# Patient Record
Sex: FEMALE | Race: WHITE
[De-identification: names, ages, dates, MRNs, and addresses within clinical notes are randomized per-mention and may not be internally consistent; named-entity substitution may affect disease eponyms.]

---

## 2019-02-05 ENCOUNTER — HOSPITAL ENCOUNTER (INPATIENT)
Dept: HOSPITAL 54 - PART | Age: 74
LOS: 4 days | Discharge: HOME HEALTH | DRG: 253 | End: 2019-02-09
Attending: FAMILY MEDICINE | Admitting: FAMILY MEDICINE
Payer: COMMERCIAL

## 2019-02-05 VITALS — BODY MASS INDEX: 43.97 KG/M2 | HEIGHT: 55 IN | WEIGHT: 190 LBS

## 2019-02-05 VITALS — DIASTOLIC BLOOD PRESSURE: 69 MMHG | SYSTOLIC BLOOD PRESSURE: 144 MMHG

## 2019-02-05 VITALS — DIASTOLIC BLOOD PRESSURE: 73 MMHG | SYSTOLIC BLOOD PRESSURE: 156 MMHG

## 2019-02-05 VITALS — SYSTOLIC BLOOD PRESSURE: 144 MMHG | DIASTOLIC BLOOD PRESSURE: 69 MMHG

## 2019-02-05 DIAGNOSIS — E11.65: ICD-10-CM

## 2019-02-05 DIAGNOSIS — Z87.11: ICD-10-CM

## 2019-02-05 DIAGNOSIS — M19.90: ICD-10-CM

## 2019-02-05 DIAGNOSIS — Z79.4: ICD-10-CM

## 2019-02-05 DIAGNOSIS — Z91.14: ICD-10-CM

## 2019-02-05 DIAGNOSIS — F43.10: ICD-10-CM

## 2019-02-05 DIAGNOSIS — E11.40: ICD-10-CM

## 2019-02-05 DIAGNOSIS — Z91.81: ICD-10-CM

## 2019-02-05 DIAGNOSIS — F43.20: ICD-10-CM

## 2019-02-05 DIAGNOSIS — E11.22: ICD-10-CM

## 2019-02-05 DIAGNOSIS — K92.2: Primary | ICD-10-CM

## 2019-02-05 DIAGNOSIS — Z86.73: ICD-10-CM

## 2019-02-05 DIAGNOSIS — K21.0: ICD-10-CM

## 2019-02-05 DIAGNOSIS — E66.01: ICD-10-CM

## 2019-02-05 DIAGNOSIS — I11.0: ICD-10-CM

## 2019-02-05 DIAGNOSIS — R00.1: ICD-10-CM

## 2019-02-05 DIAGNOSIS — H35.30: ICD-10-CM

## 2019-02-05 DIAGNOSIS — F32.9: ICD-10-CM

## 2019-02-05 DIAGNOSIS — G47.33: ICD-10-CM

## 2019-02-05 DIAGNOSIS — I31.3: ICD-10-CM

## 2019-02-05 DIAGNOSIS — Z79.02: ICD-10-CM

## 2019-02-05 DIAGNOSIS — I50.32: ICD-10-CM

## 2019-02-05 DIAGNOSIS — I10: ICD-10-CM

## 2019-02-05 DIAGNOSIS — K44.9: ICD-10-CM

## 2019-02-05 DIAGNOSIS — E78.5: ICD-10-CM

## 2019-02-05 DIAGNOSIS — Z87.440: ICD-10-CM

## 2019-02-05 DIAGNOSIS — K76.0: ICD-10-CM

## 2019-02-05 DIAGNOSIS — E11.21: ICD-10-CM

## 2019-02-05 DIAGNOSIS — I25.10: ICD-10-CM

## 2019-02-05 DIAGNOSIS — E11.9: ICD-10-CM

## 2019-02-05 DIAGNOSIS — Z95.5: ICD-10-CM

## 2019-02-05 DIAGNOSIS — E11.43: ICD-10-CM

## 2019-02-05 DIAGNOSIS — M81.0: ICD-10-CM

## 2019-02-05 DIAGNOSIS — E11.51: ICD-10-CM

## 2019-02-05 DIAGNOSIS — R06.00: ICD-10-CM

## 2019-02-05 DIAGNOSIS — Z79.82: ICD-10-CM

## 2019-02-05 DIAGNOSIS — F03.90: ICD-10-CM

## 2019-02-05 DIAGNOSIS — D63.8: ICD-10-CM

## 2019-02-05 DIAGNOSIS — K46.9: ICD-10-CM

## 2019-02-05 DIAGNOSIS — N28.1: ICD-10-CM

## 2019-02-05 LAB
ALBUMIN SERPL BCP-MCNC: 3.1 G/DL (ref 3.4–5)
ALP SERPL-CCNC: 93 U/L (ref 46–116)
ALT SERPL W P-5'-P-CCNC: 18 U/L (ref 12–78)
AMYLASE SERPL-CCNC: 30 U/L (ref 25–115)
AST SERPL W P-5'-P-CCNC: 10 U/L (ref 15–37)
BASOPHILS # BLD AUTO: 0.1 /CMM (ref 0–0.2)
BASOPHILS NFR BLD AUTO: 1.2 % (ref 0–2)
BILIRUB DIRECT SERPL-MCNC: 0.1 MG/DL (ref 0–0.2)
BILIRUB SERPL-MCNC: 0.5 MG/DL (ref 0.2–1)
BUN SERPL-MCNC: 22 MG/DL (ref 7–18)
CALCIUM SERPL-MCNC: 8.8 MG/DL (ref 8.5–10.1)
CHLORIDE SERPL-SCNC: 100 MMOL/L (ref 98–107)
CHOLEST SERPL-MCNC: 264 MG/DL (ref ?–200)
CO2 SERPL-SCNC: 31 MMOL/L (ref 21–32)
CREAT SERPL-MCNC: 1 MG/DL (ref 0.6–1.3)
EOSINOPHIL NFR BLD AUTO: 2.2 % (ref 0–6)
GLUCOSE SERPL-MCNC: 414 MG/DL (ref 74–106)
HCT VFR BLD AUTO: 32 % (ref 33–45)
HDLC SERPL-MCNC: 35 MG/DL (ref 40–60)
HGB BLD-MCNC: 10.7 G/DL (ref 11.5–14.8)
IRON SERPL-MCNC: 51 UG/DL (ref 50–175)
LDLC SERPL DIRECT ASSAY-MCNC: 166 MG/DL (ref 0–99)
LIPASE SERPL-CCNC: 133 U/L (ref 73–393)
LYMPHOCYTES NFR BLD AUTO: 1.1 /CMM (ref 0.8–4.8)
LYMPHOCYTES NFR BLD AUTO: 17.2 % (ref 20–44)
MAGNESIUM SERPL-MCNC: 1.6 MG/DL (ref 1.8–2.4)
MCHC RBC AUTO-ENTMCNC: 34 G/DL (ref 31–36)
MCV RBC AUTO: 86 FL (ref 82–100)
MONOCYTES NFR BLD AUTO: 0.4 /CMM (ref 0.1–1.3)
MONOCYTES NFR BLD AUTO: 5.9 % (ref 2–12)
NEUTROPHILS # BLD AUTO: 4.8 /CMM (ref 1.8–8.9)
NEUTROPHILS NFR BLD AUTO: 73.5 % (ref 43–81)
NT-PROBNP SERPL-MCNC: 1279 PG/ML (ref 0–125)
PLATELET # BLD AUTO: 219 /CMM (ref 150–450)
POTASSIUM SERPL-SCNC: 3.9 MMOL/L (ref 3.5–5.1)
PROT SERPL-MCNC: 6.8 G/DL (ref 6.4–8.2)
RBC # BLD AUTO: 3.67 MIL/UL (ref 4–5.2)
SODIUM SERPL-SCNC: 135 MMOL/L (ref 136–145)
TRIGL SERPL-MCNC: 356 MG/DL (ref 30–150)
WBC NRBC COR # BLD AUTO: 6.5 K/UL (ref 4.3–11)

## 2019-02-05 PROCEDURE — G0378 HOSPITAL OBSERVATION PER HR: HCPCS

## 2019-02-05 PROCEDURE — C9113 INJ PANTOPRAZOLE SODIUM, VIA: HCPCS

## 2019-02-05 PROCEDURE — A9541 TC99M SULFUR COLLOID: HCPCS

## 2019-02-05 RX ADMIN — MAGNESIUM SULFATE IN DEXTROSE SCH MLS/HR: 10 INJECTION, SOLUTION INTRAVENOUS at 23:29

## 2019-02-05 RX ADMIN — DEXTROSE MONOHYDRATE PRN MG: 50 INJECTION, SOLUTION INTRAVENOUS at 23:56

## 2019-02-05 RX ADMIN — INSULIN GLARGINE SCH UNIT: 100 INJECTION, SOLUTION SUBCUTANEOUS at 22:41

## 2019-02-05 RX ADMIN — Medication SCH EACH: at 21:41

## 2019-02-05 NOTE — NUR
RN NOTES



DISCUSSED WITH DR. ROBLEDO PT. CONDITION, NEW ORDERS WERE GIVEN. MD WILL BE IN TO SEE PT. 
IN 1-2 HOURS.

## 2019-02-05 NOTE — NUR
Low magnesium level 1.[address] Chavez, ordered Magnesium 2gm IV daily x3 doses 
total of 6 grams IV. Repeat blood sugar 367mg/dl , MD is aware. Will cont to monitor.

## 2019-02-05 NOTE — NUR
RN ADMISSION NOTE



RECEIVED PT. IN [facility] A&OX4. BREATHING UNLABORED ON ROOM AIR. PT. DENIES CHEST PAIN, BUT 
HAS NEW ONSET OF ABDOMINAL PAIN. VITALS WILL BE MEASURED. PT. WAS TRANSFERRED INTO BED 
SAFELY. NO NEW MD ORDERS GIVEN AT THIS TIME. INSTRUCTED PT. TO USE CALL LIGHT FOR 
ASSISTANCE. ALL NEEDS MET. WILL CONTINUE TO ASSESS AND MONITOR.

## 2019-02-05 NOTE — NUR
TELE RN INITIAL NOTES



Patient is awake, A/O x3, [place] citizen speaking, understand and speak English some. Stable 
oxygen saturation on RA. Sinus rhythm on the tele monitor HR 70. Abdomen rounded and firm, 
reports some discomfort, denies nausea, no vomiting. Home medication for review, will follow 
up with Dr. Chavez. Maintained safety, will cont to monitor.

## 2019-02-05 NOTE — NUR
RN CLOSING NOTES



PT. IS IN BED SITTING UP A&OX4. BREATHING UNLABORED ON ROOM AIR. PT. REPORTED ABDOMINAL PAIN 
HAS IMPROVED SINCE ARRIVING TO UNIT. PT. TOLERATED JELLO AND CRACKERS WELL. IV IS INTACT AND 
PATENT. NO S/S OF ACUTE DISTRESS. BED IS IN LOWEST, AND LOCKED POSITION. 2 SIDE RAILS UP, 
AND INSTRUCTED PT. TO USE CALL LIGHT FOR ASSISTANCE. NEW MD ORDERS WERE ADDED TO [employer]. 
ENDORSED REPORT TO NURSE.

## 2019-02-05 NOTE — NUR
PT MARC FROM , C/O DIZZINESS AND WEAK, -KO, -CP, -SOB, PT IS AAOX3, 
NOT IN RESPIRATORY DISTRESS, KEPT RESTED AND COMFORTABLE, HOOKED TO MONITOR.

## 2019-02-06 VITALS — SYSTOLIC BLOOD PRESSURE: 189 MMHG | DIASTOLIC BLOOD PRESSURE: 69 MMHG

## 2019-02-06 VITALS — DIASTOLIC BLOOD PRESSURE: 82 MMHG | SYSTOLIC BLOOD PRESSURE: 185 MMHG

## 2019-02-06 VITALS — SYSTOLIC BLOOD PRESSURE: 177 MMHG | DIASTOLIC BLOOD PRESSURE: 70 MMHG

## 2019-02-06 VITALS — DIASTOLIC BLOOD PRESSURE: 63 MMHG | SYSTOLIC BLOOD PRESSURE: 160 MMHG

## 2019-02-06 VITALS — DIASTOLIC BLOOD PRESSURE: 62 MMHG | SYSTOLIC BLOOD PRESSURE: 146 MMHG

## 2019-02-06 VITALS — SYSTOLIC BLOOD PRESSURE: 185 MMHG | DIASTOLIC BLOOD PRESSURE: 82 MMHG

## 2019-02-06 LAB
AMYLASE SERPL-CCNC: 27 U/L (ref 25–115)
BASOPHILS # BLD AUTO: 0.1 /CMM (ref 0–0.2)
BASOPHILS NFR BLD AUTO: 1 % (ref 0–2)
BUN SERPL-MCNC: 23 MG/DL (ref 7–18)
CALCIUM SERPL-MCNC: 8.9 MG/DL (ref 8.5–10.1)
CHLORIDE SERPL-SCNC: 98 MMOL/L (ref 98–107)
CO2 SERPL-SCNC: 30 MMOL/L (ref 21–32)
CREAT SERPL-MCNC: 0.9 MG/DL (ref 0.6–1.3)
EOSINOPHIL NFR BLD AUTO: 1.7 % (ref 0–6)
FERRITIN SERPL-MCNC: 83 NG/ML (ref 8–388)
GLUCOSE SERPL-MCNC: 329 MG/DL (ref 74–106)
HCT VFR BLD AUTO: 32 % (ref 33–45)
HGB BLD-MCNC: 11 G/DL (ref 11.5–14.8)
IRON SERPL-MCNC: 49 UG/DL (ref 50–175)
LIPASE SERPL-CCNC: 91 U/L (ref 73–393)
LYMPHOCYTES NFR BLD AUTO: 1 /CMM (ref 0.8–4.8)
LYMPHOCYTES NFR BLD AUTO: 14.8 % (ref 20–44)
MAGNESIUM SERPL-MCNC: 2.4 MG/DL (ref 1.8–2.4)
MCHC RBC AUTO-ENTMCNC: 35 G/DL (ref 31–36)
MCV RBC AUTO: 83 FL (ref 82–100)
MONOCYTES NFR BLD AUTO: 0.3 /CMM (ref 0.1–1.3)
MONOCYTES NFR BLD AUTO: 4.8 % (ref 2–12)
NEUTROPHILS # BLD AUTO: 5.2 /CMM (ref 1.8–8.9)
NEUTROPHILS NFR BLD AUTO: 77.7 % (ref 43–81)
PLATELET # BLD AUTO: 254 /CMM (ref 150–450)
POTASSIUM SERPL-SCNC: 3.9 MMOL/L (ref 3.5–5.1)
RBC # BLD AUTO: 3.84 MIL/UL (ref 4–5.2)
SODIUM SERPL-SCNC: 133 MMOL/L (ref 136–145)
TIBC SERPL-MCNC: 277 UG/DL (ref 250–450)
WBC NRBC COR # BLD AUTO: 6.7 K/UL (ref 4.3–11)

## 2019-02-06 RX ADMIN — Medication SCH MCG: at 09:00

## 2019-02-06 RX ADMIN — Medication SCH EACH: at 17:20

## 2019-02-06 RX ADMIN — CARVEDILOL SCH MG: 12.5 TABLET, FILM COATED ORAL at 17:00

## 2019-02-06 RX ADMIN — INSULIN ASPART SCH UNIT: 100 INJECTION, SOLUTION INTRAVENOUS; SUBCUTANEOUS at 12:28

## 2019-02-06 RX ADMIN — AMLODIPINE BESYLATE SCH MG: 5 TABLET ORAL at 09:00

## 2019-02-06 RX ADMIN — DEXTROSE MONOHYDRATE PRN MG: 50 INJECTION, SOLUTION INTRAVENOUS at 17:13

## 2019-02-06 RX ADMIN — DEXTROSE MONOHYDRATE PRN MG: 50 INJECTION, SOLUTION INTRAVENOUS at 05:55

## 2019-02-06 RX ADMIN — POTASSIUM CHLORIDE SCH MEQ: 750 TABLET, FILM COATED, EXTENDED RELEASE ORAL at 09:00

## 2019-02-06 RX ADMIN — SODIUM CHLORIDE PRN MG: 9 INJECTION, SOLUTION INTRAVENOUS at 19:50

## 2019-02-06 RX ADMIN — Medication PRN MG: at 03:51

## 2019-02-06 RX ADMIN — ARIPIPRAZOLE SCH MG: 5 TABLET ORAL at 09:00

## 2019-02-06 RX ADMIN — INSULIN ASPART SCH UNIT: 100 INJECTION, SOLUTION INTRAVENOUS; SUBCUTANEOUS at 08:19

## 2019-02-06 RX ADMIN — INSULIN ASPART SCH UNIT: 100 INJECTION, SOLUTION INTRAVENOUS; SUBCUTANEOUS at 17:00

## 2019-02-06 RX ADMIN — MAGNESIUM SULFATE IN DEXTROSE SCH MLS/HR: 10 INJECTION, SOLUTION INTRAVENOUS at 00:24

## 2019-02-06 RX ADMIN — VALSARTAN SCH MG: 80 TABLET ORAL at 09:00

## 2019-02-06 RX ADMIN — Medication SCH EACH: at 22:21

## 2019-02-06 RX ADMIN — INSULIN DETEMIR SCH UNIT: 100 INJECTION, SOLUTION SUBCUTANEOUS at 22:00

## 2019-02-06 RX ADMIN — Medication SCH EACH: at 07:54

## 2019-02-06 RX ADMIN — CARVEDILOL SCH MG: 12.5 TABLET, FILM COATED ORAL at 09:00

## 2019-02-06 RX ADMIN — FUROSEMIDE SCH MG: 20 TABLET ORAL at 09:00

## 2019-02-06 RX ADMIN — Medication PRN MG: at 00:14

## 2019-02-06 RX ADMIN — Medication PRN MG: at 17:12

## 2019-02-06 RX ADMIN — MAGNESIUM SULFATE IN DEXTROSE SCH MLS/HR: 10 INJECTION, SOLUTION INTRAVENOUS at 17:02

## 2019-02-06 RX ADMIN — INSULIN GLARGINE SCH UNIT: 100 INJECTION, SOLUTION SUBCUTANEOUS at 22:00

## 2019-02-06 RX ADMIN — FENOFIBRATE SCH MG: 145 TABLET ORAL at 09:00

## 2019-02-06 RX ADMIN — INSULIN ASPART SCH UNIT: 100 INJECTION, SOLUTION INTRAVENOUS; SUBCUTANEOUS at 09:00

## 2019-02-06 RX ADMIN — ATORVASTATIN CALCIUM SCH MG: 40 TABLET, FILM COATED ORAL at 22:00

## 2019-02-06 RX ADMIN — AMLODIPINE BESYLATE SCH MG: 5 TABLET ORAL at 21:00

## 2019-02-06 RX ADMIN — MAGNESIUM SULFATE IN DEXTROSE SCH MLS/HR: 10 INJECTION, SOLUTION INTRAVENOUS at 19:44

## 2019-02-06 NOTE — NUR
Patient is seen by Dr. Chavez. Home medications reviewed by MD and faxed to pharmacy. 
Order place for Magnesium level, Iron level and UA.

## 2019-02-06 NOTE — NUR
Received phone call from Dr. Chavez, regarding elevated BP, orders place. MD is aware of 
elevated blood sugar, per MD no sliding scale, cont current orders. Endorsed to oncoming RN.

## 2019-02-06 NOTE — NUR
TELE RN CLOSING NOTES



Patient in bed, Sinus rajesh on the tele monitor lows 35 to 64,Denies dizziness, no episode 
of syncope, no c/o chest pain. For consult today with Dr. Richter/Cardio. Patient is 
currently NPO, for CT abdomen/pelvis with contrast today. For consult Dr. Carey/GI. 
Magnesium supplemented last night. Abdominal pain managed by Morphine IV with intermittent 
relief. Blood glucose slowly trending down, MD is aware. BP still elevated 177/70, denies 
headache, no vomiting, awaiting Dr. Chavez call back. Maintained safety, will endorse to 
oncoming RN.

## 2019-02-06 NOTE — NUR
Elevated /[address] Chavez, awaiting call back. Patient in bed, appears 
comfortable, sleeping. Will cont to monitor.

## 2019-02-06 NOTE — NUR
TELE RN OPENING NOTES



PATIENT IS NAUSEOUS AND VOMITING. OFFERED ZOFRAN BUT PATIENT REFUSED. BED RAILS UPX2, LOCKED 
AND AT LOWEST POSITION. IV LINE IS INTACT AND PATENT. CALL LIGHT IS WITHIN REACH. WILL 
CONTINUE TO MONITOR.

## 2019-02-06 NOTE — NUR
TELE RN NOTES

RECEIVED ON BED A/O X3,BREATHING REGULAR,NOT IN ANY FORM DISTRESS.FAMILY MEMBERS AT 
BEDSIDE.SALINE LOCK RIGHT HAND INTACT AND PATENT.NPO STATUS AS ORDERED.PER REPORT BY DAY 
NURSE MARCI,BLOOD SUGAR ,HE SPOKE WITH DR ROBLEDO WITH ORDER TO GIVE 
NOVOLOG/HUMALOG 10 ONCE.EXPLAINED TO FAMILY MEMBERS AND THEY UNDERSTAND.WILL CONTINUE TO 
MONITOR STATUS.CALL LIGHT IN REACH,NEEDS ANTICIPATED.

## 2019-02-07 VITALS — DIASTOLIC BLOOD PRESSURE: 86 MMHG | SYSTOLIC BLOOD PRESSURE: 163 MMHG

## 2019-02-07 VITALS — SYSTOLIC BLOOD PRESSURE: 176 MMHG | DIASTOLIC BLOOD PRESSURE: 60 MMHG

## 2019-02-07 VITALS — DIASTOLIC BLOOD PRESSURE: 69 MMHG | SYSTOLIC BLOOD PRESSURE: 173 MMHG

## 2019-02-07 VITALS — SYSTOLIC BLOOD PRESSURE: 181 MMHG | DIASTOLIC BLOOD PRESSURE: 69 MMHG

## 2019-02-07 VITALS — DIASTOLIC BLOOD PRESSURE: 66 MMHG | SYSTOLIC BLOOD PRESSURE: 132 MMHG

## 2019-02-07 VITALS — SYSTOLIC BLOOD PRESSURE: 157 MMHG | DIASTOLIC BLOOD PRESSURE: 80 MMHG

## 2019-02-07 VITALS — DIASTOLIC BLOOD PRESSURE: 70 MMHG | SYSTOLIC BLOOD PRESSURE: 170 MMHG

## 2019-02-07 LAB — HEMOCCULT STL QL: NEGATIVE

## 2019-02-07 RX ADMIN — SODIUM CHLORIDE PRN MG: 9 INJECTION, SOLUTION INTRAVENOUS at 10:49

## 2019-02-07 RX ADMIN — VALSARTAN SCH MG: 80 TABLET ORAL at 10:41

## 2019-02-07 RX ADMIN — SODIUM CHLORIDE SCH MG: 9 INJECTION, SOLUTION INTRAVENOUS at 12:36

## 2019-02-07 RX ADMIN — Medication SCH MCG: at 10:42

## 2019-02-07 RX ADMIN — POTASSIUM CHLORIDE SCH MEQ: 750 TABLET, FILM COATED, EXTENDED RELEASE ORAL at 10:43

## 2019-02-07 RX ADMIN — FUROSEMIDE SCH MG: 20 TABLET ORAL at 10:43

## 2019-02-07 RX ADMIN — ATORVASTATIN CALCIUM SCH MG: 40 TABLET, FILM COATED ORAL at 21:42

## 2019-02-07 RX ADMIN — Medication SCH EACH: at 17:39

## 2019-02-07 RX ADMIN — MAGNESIUM SULFATE IN DEXTROSE SCH MLS/HR: 10 INJECTION, SOLUTION INTRAVENOUS at 15:55

## 2019-02-07 RX ADMIN — Medication SCH EACH: at 12:37

## 2019-02-07 RX ADMIN — INSULIN DETEMIR SCH UNIT: 100 INJECTION, SOLUTION SUBCUTANEOUS at 21:45

## 2019-02-07 RX ADMIN — INSULIN HUMAN PRN UNIT: 100 INJECTION, SOLUTION PARENTERAL at 21:45

## 2019-02-07 RX ADMIN — INSULIN GLARGINE SCH UNIT: 100 INJECTION, SOLUTION SUBCUTANEOUS at 21:45

## 2019-02-07 RX ADMIN — AMLODIPINE BESYLATE SCH MG: 5 TABLET ORAL at 21:43

## 2019-02-07 RX ADMIN — DEXTROSE MONOHYDRATE PRN MG: 50 INJECTION, SOLUTION INTRAVENOUS at 15:43

## 2019-02-07 RX ADMIN — INSULIN HUMAN PRN UNIT: 100 INJECTION, SOLUTION PARENTERAL at 17:42

## 2019-02-07 RX ADMIN — AMLODIPINE BESYLATE SCH MG: 5 TABLET ORAL at 10:55

## 2019-02-07 RX ADMIN — FENOFIBRATE SCH MG: 145 TABLET ORAL at 10:43

## 2019-02-07 RX ADMIN — INSULIN ASPART SCH UNIT: 100 INJECTION, SOLUTION INTRAVENOUS; SUBCUTANEOUS at 17:43

## 2019-02-07 RX ADMIN — CARVEDILOL SCH MG: 12.5 TABLET, FILM COATED ORAL at 17:49

## 2019-02-07 RX ADMIN — INSULIN HUMAN PRN UNIT: 100 INJECTION, SOLUTION PARENTERAL at 12:40

## 2019-02-07 RX ADMIN — MECLIZINE HYDROCLORIDE PRN MG: 25 TABLET ORAL at 15:27

## 2019-02-07 RX ADMIN — INSULIN ASPART SCH UNIT: 100 INJECTION, SOLUTION INTRAVENOUS; SUBCUTANEOUS at 12:38

## 2019-02-07 RX ADMIN — ARIPIPRAZOLE SCH MG: 5 TABLET ORAL at 10:45

## 2019-02-07 RX ADMIN — MAGNESIUM SULFATE IN DEXTROSE SCH MLS/HR: 10 INJECTION, SOLUTION INTRAVENOUS at 15:54

## 2019-02-07 RX ADMIN — Medication SCH EACH: at 06:35

## 2019-02-07 RX ADMIN — Medication SCH EACH: at 21:44

## 2019-02-07 RX ADMIN — CARVEDILOL SCH MG: 12.5 TABLET, FILM COATED ORAL at 10:55

## 2019-02-07 RX ADMIN — INSULIN ASPART SCH UNIT: 100 INJECTION, SOLUTION INTRAVENOUS; SUBCUTANEOUS at 11:05

## 2019-02-07 NOTE — NUR
PT WITH EPISODE OF BRADYCARDIA 30-40, WHILST ON TOILET. PT NOW REPORTING NO BMX3 DAYS AND 
CONSTIPATION. INSTRUCTED NOT TO STRAIN ON TOILET AND CNA TO ESCORT. PRUNE JUICE 
ADMINISTERED.

## 2019-02-07 NOTE — NUR
TELE RN NOTES

STILL WITH IN OFF OFF MID UPPER ABDOMINAL PAIN.NPO STATUS.PER G,CONSIDER COLONOSCOPY.EGD PER 
JANEL OLMOS,WILL DISCUSS WITH DR SLAUGHTER NO ACUTE DISTRESS.WILL ENDORSE TO DAY NURSE 
FOR EDUAR.

## 2019-02-07 NOTE — NUR
TELE/RN NOTES



RECEIVED PT. LYING IN BED. PT. IS AWAKE, ALERT AND ORIENTED X3. BREATHING EVEN AND UNLABORED 
ON ROOM AIR. NO SOB, RESPIRATORY DISTRESS OR COMPLAINTS OF PAIN NOTED AT THIS TIME. PT. WITH 
EXTERNAL CARDIAC MONITOR PRESENT AND INTACT. CURRENT RHYTHM = SINUS RHYTHM HR 72. PT. WITH 
RIGHT HAND 24 GAUGE IV SALINE LOCK PRESENT, PATENT AND INTACT. PT. WITH FAMILY MEMBERS 
PRESENT AT BEDSIDE. PER DAYSHIFT NURSE PT. IS TO BE NPO AFTER MIDNIGHT PENDING 
EGD/COLONOSCOPY TOMORROW. PT. REFUSING BOWEL PREP MEDICATION, GOLYTELY, PER DAYSHIFT NURSE 
NP REG AWARE. BED LOCKED AND IN LOWEST POSITION, SIDE RAILS UP X3, BED ALARM ON, CALL 
LIGHT WITHIN REACH.

## 2019-02-07 NOTE — NUR
TELE RN NOTES

/82,CATAPRES PATCH 0.2MG APPLIED TO LEFT UPPER BACK.DOSE CHANGED BY DR ROBLEDO FROM 
0.1MG/24H TO 0.2 MG/24 NOTED AS ORDERED.

## 2019-02-07 NOTE — NUR
TELE RN. PT RECEIVED [place] SPEAKING WITH RN FOR TRANSLATION. PT TOLERATING ROOM 
AIR WITHOUT DISTRESS. PT DENIES PAIN OR NAUSEA AT THIS TIME. PT WITH IVC AT R HAND INTACT 
AND SALINE FLUSH PATENT. PT ENDORSED AS WITH DIZZINESS OVERNIGHT AND BSC NOW PLACED FOR 
SAFETY AND BED ALARM ENGAGED. WILL FOLLOW UP WITH MD R/T NPO STATUS AND POC. BED IN LOWEST 
LOCKED POSITION WITH HANDRIALSX2 AND CALL YARBROUGH AND BELONGINGS WITHIN REACH.

## 2019-02-07 NOTE — NUR
TELE RN. MD GANGA ROBLEDO ORDERING CLEAR LIQUIDS DIET, PANTOPRAZOLE CONVERTED TO IV AND F/UP 
WITH GI TEAM R/T TO PENDING PROCEDURE. ORDERS PLACED AND GI CONTACTED.

## 2019-02-07 NOTE — NUR
MS RN NOTES

AWAKE,SITTING ON EDGE OF BED,CLAIMED MID UPPER ABDOMINAL PAIN 5/10 ON PAIN SCALE,NPO 
STATUS./82,PULSE 64.DR ROBLEDO ON THE PHONE AT THIS TIME,MADE AWARE OF PATIENT 
STATUS,WITH ORDERS NOTED AND CARRIED OUT.

## 2019-02-07 NOTE — NUR
TELE RN. PT TOLERATING ROOM AIR WITHOUT DISTRESS. PT DENIES PAIN AND INTERMITTENT NAUSEA. NO 
DIZZINESS AT THIS TIME. PT WITH IVC AT R HAND INTACT AND SL. PT REFUSING GOLYTELY. BSC AND 
BED ALARM ENGAGED. BED IN LOWEST LOCKED POSITION WITH HANDRIALSX2 AND CALL YARBROUGH AND 
BELONGINGS WITHIN REACH. WILL ENDORSE TO NIGHT NURSE AT BEDSIDE FOR EDUAR.

## 2019-02-07 NOTE — NUR
TELE RN NOTES

/69,PULSE-69,C/O RIGHT UPPER ABDOMINAL PAIN,NO IV PAIN MEDS,ON CATAPRES PATCH,NPO 
STATUS.MD ROBLEDO WAS PAGE,LEAVE MESSAGE ON VOICE MAIL,AWAITING TO CALL BACK.CHARGE NURSE 
AWARE.

## 2019-02-07 NOTE — NUR
MED NOTES. ENDORSED AS WITH NEW CATAPRESS PATCH BUT NOT SCANNED R/T COMPUTERS BEING DOWN. 
SCHEDULED PATCH HELD.

## 2019-02-08 VITALS — SYSTOLIC BLOOD PRESSURE: 133 MMHG | DIASTOLIC BLOOD PRESSURE: 57 MMHG

## 2019-02-08 VITALS — DIASTOLIC BLOOD PRESSURE: 57 MMHG | SYSTOLIC BLOOD PRESSURE: 152 MMHG

## 2019-02-08 VITALS — SYSTOLIC BLOOD PRESSURE: 124 MMHG | DIASTOLIC BLOOD PRESSURE: 48 MMHG

## 2019-02-08 VITALS — DIASTOLIC BLOOD PRESSURE: 77 MMHG | SYSTOLIC BLOOD PRESSURE: 134 MMHG

## 2019-02-08 VITALS — SYSTOLIC BLOOD PRESSURE: 150 MMHG | DIASTOLIC BLOOD PRESSURE: 68 MMHG

## 2019-02-08 VITALS — DIASTOLIC BLOOD PRESSURE: 75 MMHG | SYSTOLIC BLOOD PRESSURE: 151 MMHG

## 2019-02-08 LAB
ALBUMIN SERPL BCP-MCNC: 3.3 G/DL (ref 3.4–5)
ALP SERPL-CCNC: 87 U/L (ref 46–116)
ALT SERPL W P-5'-P-CCNC: 13 U/L (ref 12–78)
AST SERPL W P-5'-P-CCNC: 12 U/L (ref 15–37)
BASOPHILS # BLD AUTO: 0.1 /CMM (ref 0–0.2)
BASOPHILS NFR BLD AUTO: 0.9 % (ref 0–2)
BILIRUB SERPL-MCNC: 0.7 MG/DL (ref 0.2–1)
BUN SERPL-MCNC: 20 MG/DL (ref 7–18)
CALCIUM SERPL-MCNC: 8.8 MG/DL (ref 8.5–10.1)
CHLORIDE SERPL-SCNC: 98 MMOL/L (ref 98–107)
CO2 SERPL-SCNC: 31 MMOL/L (ref 21–32)
CREAT SERPL-MCNC: 0.9 MG/DL (ref 0.6–1.3)
EOSINOPHIL NFR BLD AUTO: 1.1 % (ref 0–6)
GLUCOSE SERPL-MCNC: 237 MG/DL (ref 74–106)
HCT VFR BLD AUTO: 34 % (ref 33–45)
HGB BLD-MCNC: 11.8 G/DL (ref 11.5–14.8)
IRON SERPL-MCNC: 56 UG/DL (ref 50–175)
LYMPHOCYTES NFR BLD AUTO: 1.2 /CMM (ref 0.8–4.8)
LYMPHOCYTES NFR BLD AUTO: 16.9 % (ref 20–44)
MAGNESIUM SERPL-MCNC: 2.3 MG/DL (ref 1.8–2.4)
MCHC RBC AUTO-ENTMCNC: 34 G/DL (ref 31–36)
MCV RBC AUTO: 84 FL (ref 82–100)
MONOCYTES NFR BLD AUTO: 0.3 /CMM (ref 0.1–1.3)
MONOCYTES NFR BLD AUTO: 4.7 % (ref 2–12)
NEUTROPHILS # BLD AUTO: 5.4 /CMM (ref 1.8–8.9)
NEUTROPHILS NFR BLD AUTO: 76.4 % (ref 43–81)
PLATELET # BLD AUTO: 258 /CMM (ref 150–450)
POTASSIUM SERPL-SCNC: 3.5 MMOL/L (ref 3.5–5.1)
PROT SERPL-MCNC: 6.9 G/DL (ref 6.4–8.2)
RBC # BLD AUTO: 4.1 MIL/UL (ref 4–5.2)
SODIUM SERPL-SCNC: 136 MMOL/L (ref 136–145)
WBC NRBC COR # BLD AUTO: 7 K/UL (ref 4.3–11)

## 2019-02-08 PROCEDURE — 0DB78ZX EXCISION OF STOMACH, PYLORUS, VIA NATURAL OR ARTIFICIAL OPENING ENDOSCOPIC, DIAGNOSTIC: ICD-10-PCS

## 2019-02-08 RX ADMIN — Medication SCH EACH: at 12:00

## 2019-02-08 RX ADMIN — Medication SCH EACH: at 17:25

## 2019-02-08 RX ADMIN — AMLODIPINE BESYLATE SCH MG: 5 TABLET ORAL at 09:00

## 2019-02-08 RX ADMIN — INSULIN HUMAN PRN UNIT: 100 INJECTION, SOLUTION PARENTERAL at 21:58

## 2019-02-08 RX ADMIN — VALSARTAN SCH MG: 80 TABLET ORAL at 09:00

## 2019-02-08 RX ADMIN — INSULIN ASPART SCH UNIT: 100 INJECTION, SOLUTION INTRAVENOUS; SUBCUTANEOUS at 17:23

## 2019-02-08 RX ADMIN — SODIUM CHLORIDE PRN MG: 9 INJECTION, SOLUTION INTRAVENOUS at 02:17

## 2019-02-08 RX ADMIN — POTASSIUM CHLORIDE SCH MEQ: 750 TABLET, FILM COATED, EXTENDED RELEASE ORAL at 09:00

## 2019-02-08 RX ADMIN — SODIUM CHLORIDE SCH MG: 9 INJECTION, SOLUTION INTRAVENOUS at 09:17

## 2019-02-08 RX ADMIN — Medication SCH EACH: at 22:08

## 2019-02-08 RX ADMIN — Medication SCH EACH: at 06:56

## 2019-02-08 RX ADMIN — FENOFIBRATE SCH MG: 145 TABLET ORAL at 08:56

## 2019-02-08 RX ADMIN — FUROSEMIDE SCH MG: 20 TABLET ORAL at 09:00

## 2019-02-08 RX ADMIN — ARIPIPRAZOLE SCH MG: 5 TABLET ORAL at 09:00

## 2019-02-08 RX ADMIN — INSULIN ASPART SCH UNIT: 100 INJECTION, SOLUTION INTRAVENOUS; SUBCUTANEOUS at 13:00

## 2019-02-08 RX ADMIN — INSULIN HUMAN PRN UNIT: 100 INJECTION, SOLUTION PARENTERAL at 17:24

## 2019-02-08 RX ADMIN — Medication SCH MCG: at 08:55

## 2019-02-08 RX ADMIN — INSULIN ASPART SCH UNIT: 100 INJECTION, SOLUTION INTRAVENOUS; SUBCUTANEOUS at 09:00

## 2019-02-08 RX ADMIN — CARVEDILOL SCH MG: 12.5 TABLET, FILM COATED ORAL at 17:22

## 2019-02-08 RX ADMIN — AMLODIPINE BESYLATE SCH MG: 5 TABLET ORAL at 21:51

## 2019-02-08 RX ADMIN — ATORVASTATIN CALCIUM SCH MG: 40 TABLET, FILM COATED ORAL at 21:52

## 2019-02-08 NOTE — NUR
RN OPENING NOTES



RECEIVED REPORT FROM DERICHIURSULA AVILA. FOUND Pt RESTING IN BED. FAMILY VISITING AT 
BEDSIDE. RESPIRATIONS EVEN AND UNLABORED. NO S/S OF ACUTE DISTRESS OR SOB NOTED. PER REPORT 
Pt IS A/OX4, [place]onese SPEAKING, BUT UNDERSTANDS SOME ENGLISH. IV ACCESS ON [facility] #24G, SL. 
SAFETY MEASURES IN PLACE. BED LOW, LOCKED, HOB ELEVATED, SIDE RAILS UP, CALL LIGHT AND 
BEDSIDE TABLE WITHIN REACH. BED ALARM ON. WILL CONTINUE TO MONITOR Pt's CONDITION AND SAFETY 
THROUGHOUT THE NIGHT.

## 2019-02-08 NOTE — NUR
Patient transferred to OR via bed, in stable condition, for EGD procedure. Vital signs 
stable. Endorsed care to transport. Cell phone and earrings in unit safe until patient 
return.

## 2019-02-08 NOTE — NUR
Patient returned to room from EGD, in stable condition. Vital signs stable. Will continue to 
monitor as needed.

## 2019-02-08 NOTE — NUR
MS RN Closing Notes



Patient asleep, resting in bed. Alert and oriented x3, [place]ym[place] speaking. No complaints of 
dizziness or pain at this time. Respirations even and unlabored on room air, no acute 
distress noted. Peripheral IV to the right hand 24 gauge, intact, patent and saline locked. 
Tolerating clear liquid diet well, advancing as tolerated as ordered. Updated patient and 
family on current plan of care and safety measures. No acute events this shift. Safety and 
fall precautions in place: bed in lowest and locked position, side rails up x2, bed alarm 
on, call light and personal possessions within reach. Patient verbalized understanding. Will 
endorse to night shift RN for continuity of care.

## 2019-02-08 NOTE — NUR
TELE/RN NOTES



PT. IS SITTING UP IN BED RESTING. BREATHING EVEN AND UNLABORED ON ROOM AIR. NO SOB, 
RESPIRATORY DISTRESS OR COMPLAINTS OF PAIN NOTED AT THIS TIME. PT. WITH EXTERNAL CARDIAC 
MONITOR PRESENT AND INTACT. CURRENT RHYTHM = SINUS RHYTHM HR 70. PT. WITH RIGHT HAND 24 
GAUGE IV SALINE LOCK PRESENT, PATENT AND INTACT. PT. HAS BEEN NPO SINCE MIDNIGHT PENDING EGD 
TODAY. ALL PT. NEEDS MET. CONSENT SIGNED AND PLACED IN PT. CHART. BED LOCKED AND IN LOWEST 
POSITION, SIDE RAILS UP X3, BED ALARM ON, CALL LIGHT WITHIN REACH. WILL ENDORSE TO DAYSHIFT 
NURSE FOR CONTINUITY OF CARE.

## 2019-02-08 NOTE — NUR
TELE RN NOTES

C/O HEADACHE,/84,PULSE-71,MEDICATED WITH HYDRALAZINE 10MG IV AS ORDERED FOR 
SBP>160.WILL CONTINUE TO MONITOR.

## 2019-02-08 NOTE — NUR
Tele RN Opening Notes



Patient awake, resting in bed. Alert and oriented x3, Bengali speaking. No complaints of 
dizziness or pain at this time. Respirations even and unlabored on room air, no acute 
distress noted. External cardiac monitor, current rhythm sinus rhythm at 71 bpm. Peripheral 
IV to the right hand 24 gauge, intact, patent and saline locked. Patient currently NPO since 
midnight for EGD today. Updated patient on current plan of care and safety meaures. Safety 
and fall precautions in place: bed in lowest and locked position, side rails up x2, bed 
alarm on, call light and personal possessions within reach. Patient verbalized 
understanding. Will continue to monitor and intervene as needed.

## 2019-02-08 NOTE — NUR
RN NOTES



HS ACCUCHECK . ADMINISTERED SCHEDULED LANTUS 34UN. GAVE ADDITIONAL COVERAGE 6UN OF 
REGULAR INSULIN. Pt IS EATING. SNACKS AT BEDSIDE. WILL CONTINUE TO MONITOR Pt's BG LEVELS.

## 2019-02-09 VITALS — SYSTOLIC BLOOD PRESSURE: 158 MMHG | DIASTOLIC BLOOD PRESSURE: 84 MMHG

## 2019-02-09 VITALS — DIASTOLIC BLOOD PRESSURE: 84 MMHG | SYSTOLIC BLOOD PRESSURE: 158 MMHG

## 2019-02-09 LAB
BASOPHILS # BLD AUTO: 0.1 /CMM (ref 0–0.2)
BASOPHILS NFR BLD AUTO: 1.1 % (ref 0–2)
BUN SERPL-MCNC: 26 MG/DL (ref 7–18)
CALCIUM SERPL-MCNC: 9 MG/DL (ref 8.5–10.1)
CHLORIDE SERPL-SCNC: 101 MMOL/L (ref 98–107)
CO2 SERPL-SCNC: 32 MMOL/L (ref 21–32)
CREAT SERPL-MCNC: 0.9 MG/DL (ref 0.6–1.3)
EOSINOPHIL NFR BLD AUTO: 2.4 % (ref 0–6)
GLUCOSE SERPL-MCNC: 225 MG/DL (ref 74–106)
HCT VFR BLD AUTO: 34 % (ref 33–45)
HGB BLD-MCNC: 11.7 G/DL (ref 11.5–14.8)
LYMPHOCYTES NFR BLD AUTO: 1.5 /CMM (ref 0.8–4.8)
LYMPHOCYTES NFR BLD AUTO: 23.9 % (ref 20–44)
MAGNESIUM SERPL-MCNC: 2.2 MG/DL (ref 1.8–2.4)
MCHC RBC AUTO-ENTMCNC: 34 G/DL (ref 31–36)
MCV RBC AUTO: 84 FL (ref 82–100)
MONOCYTES NFR BLD AUTO: 0.5 /CMM (ref 0.1–1.3)
MONOCYTES NFR BLD AUTO: 7.6 % (ref 2–12)
NEUTROPHILS # BLD AUTO: 4.2 /CMM (ref 1.8–8.9)
NEUTROPHILS NFR BLD AUTO: 65 % (ref 43–81)
PHOSPHATE SERPL-MCNC: 3.6 MG/DL (ref 2.5–4.9)
PLATELET # BLD AUTO: 250 /CMM (ref 150–450)
POTASSIUM SERPL-SCNC: 3.9 MMOL/L (ref 3.5–5.1)
RBC # BLD AUTO: 4.07 MIL/UL (ref 4–5.2)
SODIUM SERPL-SCNC: 139 MMOL/L (ref 136–145)
WBC NRBC COR # BLD AUTO: 6.4 K/UL (ref 4.3–11)

## 2019-02-09 RX ADMIN — MECLIZINE HYDROCLORIDE PRN MG: 25 TABLET ORAL at 13:26

## 2019-02-09 RX ADMIN — ARIPIPRAZOLE SCH MG: 5 TABLET ORAL at 08:55

## 2019-02-09 RX ADMIN — INSULIN HUMAN PRN UNIT: 100 INJECTION, SOLUTION PARENTERAL at 12:21

## 2019-02-09 RX ADMIN — FENOFIBRATE SCH MG: 145 TABLET ORAL at 08:55

## 2019-02-09 RX ADMIN — FUROSEMIDE SCH MG: 20 TABLET ORAL at 08:55

## 2019-02-09 RX ADMIN — AMLODIPINE BESYLATE SCH MG: 5 TABLET ORAL at 08:55

## 2019-02-09 RX ADMIN — Medication SCH EACH: at 12:14

## 2019-02-09 RX ADMIN — INSULIN HUMAN PRN UNIT: 100 INJECTION, SOLUTION PARENTERAL at 06:28

## 2019-02-09 RX ADMIN — POTASSIUM CHLORIDE SCH MEQ: 750 TABLET, FILM COATED, EXTENDED RELEASE ORAL at 08:54

## 2019-02-09 RX ADMIN — INSULIN ASPART SCH UNIT: 100 INJECTION, SOLUTION INTRAVENOUS; SUBCUTANEOUS at 09:08

## 2019-02-09 RX ADMIN — VALSARTAN SCH MG: 80 TABLET ORAL at 08:56

## 2019-02-09 RX ADMIN — SODIUM CHLORIDE SCH MG: 9 INJECTION, SOLUTION INTRAVENOUS at 08:54

## 2019-02-09 RX ADMIN — CARVEDILOL SCH MG: 12.5 TABLET, FILM COATED ORAL at 08:58

## 2019-02-09 RX ADMIN — Medication SCH EACH: at 06:21

## 2019-02-09 RX ADMIN — Medication SCH MCG: at 09:07

## 2019-02-09 RX ADMIN — INSULIN ASPART SCH UNIT: 100 INJECTION, SOLUTION INTRAVENOUS; SUBCUTANEOUS at 12:22

## 2019-02-09 NOTE — NUR
MS RN DISCHARGE NOTES





PATIENT A/O X4, IN STABLE CONDITION. ON ROOM AIR, RESPIRATION EVEN. NOT IN ANY DISTRESS 
NOTED. MECLIZINE GIVEN PER PT REQUEST BEFORE LEAVING THE UNIT. TO DISCHARGE HOME WITH 
[facility],  MADE AWARE AND FAXED INFORMATION OF PT. SKIN INTACT. 
REMOVED PIV, DRY DRESSING APPLIED TO RIGHT HAND. REVIEWED AND SIGNED DISCHARGE INSTRUCTIONS 
WITH THE PT. ALL NEEDS AND CARE PROVIDED. ALL BELONGINGS ACCOUNTED FOR AND SIGNED. MD AND 
CHARGE NURSE AWARE. PT LEFT THE UNIT AT 1355, ESCORTED TO THE LOBBY.

## 2019-02-09 NOTE — NUR
RN CLOSING NOTES



NO SIGNIFICANT CHANGES IN Pt's CONDITION. Pt REMAINS STABLE PER BASELINE. Pt IS RESTING IN 
BED. NO S/S OF ACUTE DISTRESS OR SOB NOTED DURING THE NIGHT. NO C/O N/V DURING THE SHIFT. 
ALL NEEDS MET AND ATTENDED TO. SAFETY MEASURES IN PLACE. BED LOW, LOCKED, HOB ELEVATED, SIDE 
RAILS UP, CALL LIGHT WITHIN REACH, AND BEDSIDE TABLE WITHIN REACH. WILL ENDORSE TO DAYSHIFT 
RN FOR Pt's EDUAR.

## 2019-02-09 NOTE — NUR
MS RN OPENING NOTES





RECEIVED PATIENT AWAKE, ALERT AND ORIENTED X3. ON ROOM AIR, WITH NO SOB NOTED. ON IV SL TO 
RIGHT HAND 24G, INTACT AND PATENT. SAFETY MEASURES IN PLACE. BED IN LOW LOCKED POSITION WITH 
SR X2. PT ABLE TO ASK FOR ASSISTANCE. WILL MONITOR FOR N/V EPISODES, NO EPISODE NOTED AT 
THIS TIME.  WILL CONTINUE TO MONITOR.